# Patient Record
Sex: FEMALE | Race: WHITE | NOT HISPANIC OR LATINO | URBAN - METROPOLITAN AREA
[De-identification: names, ages, dates, MRNs, and addresses within clinical notes are randomized per-mention and may not be internally consistent; named-entity substitution may affect disease eponyms.]

---

## 2019-04-13 ENCOUNTER — INPATIENT (INPATIENT)
Facility: HOSPITAL | Age: 34
LOS: 3 days | Discharge: NURSING, REHAB OR LONG-TERM CARE | End: 2019-04-17
Attending: HOSPITALIST | Admitting: HOSPITALIST
Payer: COMMERCIAL

## 2019-04-13 VITALS
RESPIRATION RATE: 18 BRPM | OXYGEN SATURATION: 100 % | TEMPERATURE: 100 F | DIASTOLIC BLOOD PRESSURE: 44 MMHG | HEART RATE: 119 BPM | WEIGHT: 130.07 LBS | SYSTOLIC BLOOD PRESSURE: 106 MMHG | HEIGHT: 67 IN

## 2019-04-13 DIAGNOSIS — F13.230 SEDATIVE, HYPNOTIC OR ANXIOLYTIC DEPENDENCE WITH WITHDRAWAL, UNCOMPLICATED: ICD-10-CM

## 2019-04-13 LAB
ADD ON TEST-SPECIMEN IN LAB: SIGNIFICANT CHANGE UP
ALBUMIN SERPL ELPH-MCNC: 4.3 G/DL — SIGNIFICANT CHANGE UP (ref 3.3–5)
ALP SERPL-CCNC: 51 U/L — SIGNIFICANT CHANGE UP (ref 40–120)
ALT FLD-CCNC: 21 U/L — SIGNIFICANT CHANGE UP (ref 12–78)
AMPHET UR-MCNC: NEGATIVE — SIGNIFICANT CHANGE UP
ANION GAP SERPL CALC-SCNC: 7 MMOL/L — SIGNIFICANT CHANGE UP (ref 5–17)
APAP SERPL-MCNC: <2 UG/ML — LOW (ref 10–30)
APPEARANCE UR: CLEAR — SIGNIFICANT CHANGE UP
AST SERPL-CCNC: 9 U/L — LOW (ref 15–37)
BACTERIA # UR AUTO: ABNORMAL
BARBITURATES UR SCN-MCNC: NEGATIVE — SIGNIFICANT CHANGE UP
BASOPHILS # BLD AUTO: 0.07 K/UL — SIGNIFICANT CHANGE UP (ref 0–0.2)
BASOPHILS NFR BLD AUTO: 0.4 % — SIGNIFICANT CHANGE UP (ref 0–2)
BENZODIAZ UR-MCNC: POSITIVE — SIGNIFICANT CHANGE UP
BILIRUB SERPL-MCNC: 0.4 MG/DL — SIGNIFICANT CHANGE UP (ref 0.2–1.2)
BILIRUB UR-MCNC: ABNORMAL
BUN SERPL-MCNC: 18 MG/DL — SIGNIFICANT CHANGE UP (ref 7–23)
CALCIUM SERPL-MCNC: 8.8 MG/DL — SIGNIFICANT CHANGE UP (ref 8.5–10.1)
CHLORIDE SERPL-SCNC: 105 MMOL/L — SIGNIFICANT CHANGE UP (ref 96–108)
CO2 SERPL-SCNC: 30 MMOL/L — SIGNIFICANT CHANGE UP (ref 22–31)
COCAINE METAB.OTHER UR-MCNC: NEGATIVE — SIGNIFICANT CHANGE UP
COLOR SPEC: YELLOW — SIGNIFICANT CHANGE UP
COMMENT - URINE: SIGNIFICANT CHANGE UP
CREAT SERPL-MCNC: 0.8 MG/DL — SIGNIFICANT CHANGE UP (ref 0.5–1.3)
DIFF PNL FLD: ABNORMAL
EOSINOPHIL # BLD AUTO: 0 K/UL — SIGNIFICANT CHANGE UP (ref 0–0.5)
EOSINOPHIL NFR BLD AUTO: 0 % — SIGNIFICANT CHANGE UP (ref 0–6)
EPI CELLS # UR: SIGNIFICANT CHANGE UP
ETHANOL SERPL-MCNC: <10 MG/DL — SIGNIFICANT CHANGE UP (ref 0–10)
GLUCOSE SERPL-MCNC: 93 MG/DL — SIGNIFICANT CHANGE UP (ref 70–99)
GLUCOSE UR QL: NEGATIVE MG/DL — SIGNIFICANT CHANGE UP
HCG SERPL-ACNC: <1 MIU/ML — SIGNIFICANT CHANGE UP
HCT VFR BLD CALC: 34.2 % — LOW (ref 34.5–45)
HGB BLD-MCNC: 11.6 G/DL — SIGNIFICANT CHANGE UP (ref 11.5–15.5)
IMM GRANULOCYTES NFR BLD AUTO: 0.4 % — SIGNIFICANT CHANGE UP (ref 0–1.5)
KETONES UR-MCNC: ABNORMAL
LEUKOCYTE ESTERASE UR-ACNC: ABNORMAL
LYMPHOCYTES # BLD AUTO: 13.8 % — SIGNIFICANT CHANGE UP (ref 13–44)
LYMPHOCYTES # BLD AUTO: 2.26 K/UL — SIGNIFICANT CHANGE UP (ref 1–3.3)
MCHC RBC-ENTMCNC: 29.3 PG — SIGNIFICANT CHANGE UP (ref 27–34)
MCHC RBC-ENTMCNC: 33.9 GM/DL — SIGNIFICANT CHANGE UP (ref 32–36)
MCV RBC AUTO: 86.4 FL — SIGNIFICANT CHANGE UP (ref 80–100)
METHADONE UR-MCNC: NEGATIVE — SIGNIFICANT CHANGE UP
MONOCYTES # BLD AUTO: 0.84 K/UL — SIGNIFICANT CHANGE UP (ref 0–0.9)
MONOCYTES NFR BLD AUTO: 5.1 % — SIGNIFICANT CHANGE UP (ref 2–14)
NEUTROPHILS # BLD AUTO: 13.11 K/UL — HIGH (ref 1.8–7.4)
NEUTROPHILS NFR BLD AUTO: 80.3 % — HIGH (ref 43–77)
NITRITE UR-MCNC: NEGATIVE — SIGNIFICANT CHANGE UP
NRBC # BLD: 0 /100 WBCS — SIGNIFICANT CHANGE UP (ref 0–0)
OPIATES UR-MCNC: NEGATIVE — SIGNIFICANT CHANGE UP
PCP SPEC-MCNC: SIGNIFICANT CHANGE UP
PCP UR-MCNC: NEGATIVE — SIGNIFICANT CHANGE UP
PH UR: 6.5 — SIGNIFICANT CHANGE UP (ref 5–8)
PLATELET # BLD AUTO: 408 K/UL — HIGH (ref 150–400)
POTASSIUM SERPL-MCNC: 3 MMOL/L — LOW (ref 3.5–5.3)
POTASSIUM SERPL-SCNC: 3 MMOL/L — LOW (ref 3.5–5.3)
PROT SERPL-MCNC: 8.2 GM/DL — SIGNIFICANT CHANGE UP (ref 6–8.3)
PROT UR-MCNC: 30 MG/DL
RBC # BLD: 3.96 M/UL — SIGNIFICANT CHANGE UP (ref 3.8–5.2)
RBC # FLD: 13.2 % — SIGNIFICANT CHANGE UP (ref 10.3–14.5)
RBC CASTS # UR COMP ASSIST: ABNORMAL /HPF (ref 0–4)
SALICYLATES SERPL-MCNC: 1.8 MG/DL — LOW (ref 2.8–20)
SODIUM SERPL-SCNC: 142 MMOL/L — SIGNIFICANT CHANGE UP (ref 135–145)
SP GR SPEC: 1.01 — SIGNIFICANT CHANGE UP (ref 1.01–1.02)
THC UR QL: NEGATIVE — SIGNIFICANT CHANGE UP
TSH SERPL-MCNC: 0.91 UU/ML — SIGNIFICANT CHANGE UP (ref 0.34–4.82)
UROBILINOGEN FLD QL: 1 MG/DL
WBC # BLD: 16.35 K/UL — HIGH (ref 3.8–10.5)
WBC # FLD AUTO: 16.35 K/UL — HIGH (ref 3.8–10.5)
WBC UR QL: SIGNIFICANT CHANGE UP

## 2019-04-13 PROCEDURE — 71045 X-RAY EXAM CHEST 1 VIEW: CPT | Mod: 26

## 2019-04-13 PROCEDURE — 99285 EMERGENCY DEPT VISIT HI MDM: CPT

## 2019-04-13 PROCEDURE — 93010 ELECTROCARDIOGRAM REPORT: CPT

## 2019-04-13 PROCEDURE — 90792 PSYCH DIAG EVAL W/MED SRVCS: CPT | Mod: GT

## 2019-04-13 PROCEDURE — 70450 CT HEAD/BRAIN W/O DYE: CPT | Mod: 26

## 2019-04-13 RX ORDER — BUPRENORPHINE AND NALOXONE 2; .5 MG/1; MG/1
2 TABLET SUBLINGUAL
Qty: 0 | Refills: 0 | COMMUNITY

## 2019-04-13 RX ORDER — IBUPROFEN 200 MG
200 TABLET ORAL EVERY 8 HOURS
Qty: 0 | Refills: 0 | Status: DISCONTINUED | OUTPATIENT
Start: 2019-04-13 | End: 2019-04-17

## 2019-04-13 RX ORDER — NITROFURANTOIN MACROCRYSTAL 50 MG
100 CAPSULE ORAL ONCE
Qty: 0 | Refills: 0 | Status: COMPLETED | OUTPATIENT
Start: 2019-04-13 | End: 2019-04-13

## 2019-04-13 RX ORDER — NITROFURANTOIN MACROCRYSTAL 50 MG
100 CAPSULE ORAL
Qty: 0 | Refills: 0 | Status: DISCONTINUED | OUTPATIENT
Start: 2019-04-13 | End: 2019-04-14

## 2019-04-13 RX ORDER — POTASSIUM CHLORIDE 20 MEQ
40 PACKET (EA) ORAL ONCE
Qty: 0 | Refills: 0 | Status: COMPLETED | OUTPATIENT
Start: 2019-04-13 | End: 2019-04-13

## 2019-04-13 RX ORDER — FLUCONAZOLE 150 MG/1
150 TABLET ORAL ONCE
Qty: 0 | Refills: 0 | Status: COMPLETED | OUTPATIENT
Start: 2019-04-13 | End: 2019-04-13

## 2019-04-13 RX ORDER — ACETAMINOPHEN 500 MG
650 TABLET ORAL EVERY 6 HOURS
Qty: 0 | Refills: 0 | Status: DISCONTINUED | OUTPATIENT
Start: 2019-04-13 | End: 2019-04-17

## 2019-04-13 RX ADMIN — Medication 2 MILLIGRAM(S): at 19:27

## 2019-04-13 RX ADMIN — Medication 40 MILLIEQUIVALENT(S): at 17:36

## 2019-04-13 RX ADMIN — Medication 100 MILLIGRAM(S): at 17:36

## 2019-04-13 RX ADMIN — FLUCONAZOLE 150 MILLIGRAM(S): 150 TABLET ORAL at 17:36

## 2019-04-13 NOTE — H&P ADULT - NSHPPHYSICALEXAM_GEN_ALL_CORE
Vital Signs Last 24 Hrs  T(C): 36.9 (13 Apr 2019 21:26), Max: 37.8 (13 Apr 2019 12:50)  T(F): 98.4 (13 Apr 2019 21:26), Max: 100 (13 Apr 2019 12:50)  HR: 92 (13 Apr 2019 22:43) (84 - 119)  BP: 112/62 (13 Apr 2019 22:43) (88/66 - 131/79)  RR: 18 (13 Apr 2019 22:43) (18 - 20)  SpO2: 100% (13 Apr 2019 22:43) (97% - 100%)
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
## 2019-04-13 NOTE — ED ADULT NURSE REASSESSMENT NOTE - NS ED NURSE REASSESS COMMENT FT1
Pt talking with telepsych and remains on 1:1. Pt is more lucid and hallucinations has decreased. Pt was given dinner. Will continue to monitor.

## 2019-04-13 NOTE — ED ADULT NURSE NOTE - SUBSTANCE USE COMMENT, PROFILE
hx of substance abuse- currently taking prescribed xanax but reports taking multiple doses at time over the last few weeks.
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
## 2019-04-13 NOTE — ED ADULT NURSE NOTE - TEMPLATE
Alexandra Milner General

## 2019-04-13 NOTE — H&P ADULT - NEUROLOGICAL DETAILS
responds to pain/normal strength/sensation intact/responds to verbal commands/deep reflexes intact/cranial nerves intact

## 2019-04-13 NOTE — ED BEHAVIORAL HEALTH ASSESSMENT NOTE - OTHER PAST PSYCHIATRIC HISTORY (INCLUDE DETAILS REGARDING ONSET, COURSE OF ILLNESS, INPATIENT/OUTPATIENT TREATMENT)
hx of anxiety and depression, no psychiatrist or therapist; has medications prescribed by Olya Mustafa NP (Family Practice); hx of anxiety and depression, no psychiatrist or therapist; has medications prescribed by Olya Mustafa NP (Family Practice); had been seeing therapist, but was missing multiple appointments hx of anxiety and depression, no psychiatrist or therapist; has medications prescribed by Olya Mustafa NP (Family Practice); had been seeing therapist, but was missing multiple appointments; hx of SIB in the past, but no suicide attempt
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
## 2019-04-13 NOTE — ED PROVIDER NOTE - CLINICAL SUMMARY MEDICAL DECISION MAKING FREE TEXT BOX
35 y/o female with tangential thoughts, poly substance abuse. Plan: EKG, CXR, labs, CT, psych. 35 y/o female with tangential thoughts, polysubstance abuse. Plan: EKG, CXR, labs, CT, psych.

## 2019-04-13 NOTE — ED BEHAVIORAL HEALTH ASSESSMENT NOTE - DESCRIPTION (FIRST USE, LAST USE, QUANTITY, FREQUENCY, DURATION)
has cut back, 1/3 PPD; hasn't smoked in 3-4 days occasional marijuana use has tried, but none in years taking Suboxone for 10 years; had a surgery and was put on narcotics, overused them; was smoking roxicodone and doing heroin patient has prescription, but overuses at times

## 2019-04-13 NOTE — ED BEHAVIORAL HEALTH ASSESSMENT NOTE - RISK ASSESSMENT
Acute Suicide Risk  (  ) High   (  ) Moderate   ( X ) Low   (  ) Unable to determine   Rationale: At this time, she is going to continue in a supervised setting. Beyond this, she has no history of suicide attempt, and although she has a history of cutting, this is distant at this time. She is also participating in safety plan, has supportive family, is future oriented and denies any thoughts of self-harm and/or suicide.     Elevated Chronic Risk   ( X ) Yes : given above
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
## 2019-04-13 NOTE — ED ADULT NURSE REASSESSMENT NOTE - COMFORT CARE
po fluids offered/meal provided/plan of care explained
plan of care explained/po fluids offered/meal provided
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
## 2019-04-13 NOTE — ED BEHAVIORAL HEALTH ASSESSMENT NOTE - SUBSTANCE ISSUES AND PLAN (INCLUDE STANDING AND PRN MEDICATION)
Recommend initiating an Ativan taper - may consider starting Ativan 2 mg three times a day, with plan for taper from there; would place patient on CIWA protocol to monitor S/S of worsening withdrawal; would recommend additional symptomatic management of withdrawal symptoms (e.g. Motrin for pain, Trazodone as needed insomnia); discussed the importance of inpatient detoxification given the impact her benzodiazepine overuse has had on her anxiety and mood symptoms Recommend Ativan taper - consider starting Ativan 2 mg three times a day, with plan for taper from there; would place patient on CIWA protocol to monitor S/S of worsening withdrawal; would recommend additional symptomatic management of withdrawal symptoms (e.g. Motrin for pain, Trazodone as needed insomnia); discussed the importance of inpatient detoxification given the impact her benzodiazepine overuse has had on her anxiety and mood symptoms; continue  Suboxone 8-2 mg sl film 1 film BID

## 2019-04-13 NOTE — ED ADULT NURSE REASSESSMENT NOTE - NS ED NURSE REASSESS COMMENT FT1
pt VSS. pt endorses hearing her mother in law talking to her, unknown what pt mother in law said, pt unable to determine. pt on 1:1 watch, safety maintained,will CTM & reassess pt.

## 2019-04-13 NOTE — ED ADULT NURSE REASSESSMENT NOTE - NS ED NURSE REASSESS COMMENT FT1
RN spoke with Idalia BIRD from telepsych who stated she will speak psychiatrist and will call back to speak with pt's mom. Pt remains calm and cooperative, however having active hallucinations. Will continue to monitor.

## 2019-04-13 NOTE — ED PROVIDER NOTE - PROGRESS NOTE DETAILS
Henriibrishi WHITFIELD for ED attending, Dr. Payne: Sign out to Dr. Jerez, pending work up and psych eval at this time. Shaun WHITFIELD for ED attending, Dr. Payne: Sign out to Dr. Jerez, pending work up and psych eval at this time. Patient noticed by 1:1 to be eating her name bracelet; redirected at this time. received sign out pending ct labs psych - spoke with telepsych - will see patient shortly. pt reports she has burning with urination and white discharge. pt has had yeast infection in past and states this feels similar  - will treat with fluconazole and macrobid. Ori Jerez M.D., Attending Physician spoke with telepsych  - rec for ativan taper of 2mg PO TID - also rec for inpatient detox/medical admit for benzo withdrawal seizure/psychosis. pt currently resting comfortably. eating. no shakiness or seizures in ED> endorsed to Dr. Alaina Jerez M.D., Attending Physician
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
## 2019-04-13 NOTE — ED ADULT NURSE NOTE - CAS EDN DISCHARGE ASSESSMENT
Patient baseline mental status/Alert and oriented to person, place and time/No adverse reaction to first time med in ED

## 2019-04-13 NOTE — ED BEHAVIORAL HEALTH ASSESSMENT NOTE - OTHER
[facility]  ([facility], had been incarcerated in [place] for 5 yrs,  in January 2018, 1 month after 's release- although somewhat confused about this) na defer to Emergency Department assessment
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
## 2019-04-13 NOTE — ED ADULT NURSE REASSESSMENT NOTE - NS ED NURSE REASSESS COMMENT FT1
Pt is axox1, actively hallucinating hearing and seeing people. Pt remains on 1:1 and awaiting psych consult. Will continue to monitor.

## 2019-04-13 NOTE — ED ADULT NURSE REASSESSMENT NOTE - NS ED NURSE REASSESS COMMENT FT1
pt resting in stretcher, eating food tray provided. pt on 1:1. pt a/ox3, denies AH/ VH, SI/HI. pt denies complaints/needs at this time. pt family at bedside. VSS. pt safety maintained, will CTM.

## 2019-04-13 NOTE — ED ADULT NURSE NOTE - ED STAT RN HANDOFF DETAILS
Lisette MULLINS Lisette MULLINS    Received report from SUSANNA Higgins, pt awaiting dispo from tele psych for admission. belongings locked with security pt on 1:1.

## 2019-04-13 NOTE — ED ADULT NURSE REASSESSMENT NOTE - NS ED NURSE REASSESS COMMENT FT1
RN brought down to pharmacy pt med from home Buprenorphine-naloxone. 7 packets were closed and 1 packet was open with 2 sublingual medication. This was witness with Anitha miles and given to Shelia, Pharmacist.

## 2019-04-13 NOTE — ED ADULT NURSE NOTE - NSIMPLEMENTINTERV_GEN_ALL_ED
Implemented All Universal Safety Interventions:  [place] to call system. Call bell, personal items and telephone within reach. Instruct patient to call for assistance. Room bathroom lighting operational. Non-slip footwear when patient is off stretcher. Physically safe environment: no spills, clutter or unnecessary equipment. Stretcher in lowest position, wheels locked, appropriate side rails in place.

## 2019-04-13 NOTE — ED PROVIDER NOTE - CARE PLAN
Principal Discharge DX:	Benzodiazepine withdrawal, with delirium  Secondary Diagnosis:	Seizure  Secondary Diagnosis:	UTI (urinary tract infection)  Secondary Diagnosis:	Hypokalemia

## 2019-04-14 LAB
ADD ON TEST-SPECIMEN IN LAB: SIGNIFICANT CHANGE UP
ALBUMIN SERPL ELPH-MCNC: 4.4 G/DL — SIGNIFICANT CHANGE UP (ref 3.3–5)
ALP SERPL-CCNC: 52 U/L — SIGNIFICANT CHANGE UP (ref 40–120)
ALT FLD-CCNC: 21 U/L — SIGNIFICANT CHANGE UP (ref 12–78)
ANION GAP SERPL CALC-SCNC: 8 MMOL/L — SIGNIFICANT CHANGE UP (ref 5–17)
AST SERPL-CCNC: 15 U/L — SIGNIFICANT CHANGE UP (ref 15–37)
BILIRUB DIRECT SERPL-MCNC: 0.1 MG/DL — SIGNIFICANT CHANGE UP (ref 0–0.2)
BILIRUB INDIRECT FLD-MCNC: 0.3 MG/DL — SIGNIFICANT CHANGE UP (ref 0.2–1)
BILIRUB SERPL-MCNC: 0.4 MG/DL — SIGNIFICANT CHANGE UP (ref 0.2–1.2)
BUN SERPL-MCNC: 16 MG/DL — SIGNIFICANT CHANGE UP (ref 7–23)
CALCIUM SERPL-MCNC: 8.7 MG/DL — SIGNIFICANT CHANGE UP (ref 8.5–10.1)
CHLORIDE SERPL-SCNC: 108 MMOL/L — SIGNIFICANT CHANGE UP (ref 96–108)
CO2 SERPL-SCNC: 26 MMOL/L — SIGNIFICANT CHANGE UP (ref 22–31)
CREAT SERPL-MCNC: 0.74 MG/DL — SIGNIFICANT CHANGE UP (ref 0.5–1.3)
CULTURE RESULTS: SIGNIFICANT CHANGE UP
GLUCOSE SERPL-MCNC: 116 MG/DL — HIGH (ref 70–99)
HCT VFR BLD CALC: 35.7 % — SIGNIFICANT CHANGE UP (ref 34.5–45)
HGB BLD-MCNC: 11.9 G/DL — SIGNIFICANT CHANGE UP (ref 11.5–15.5)
LACTATE SERPL-SCNC: 1.5 MMOL/L — SIGNIFICANT CHANGE UP (ref 0.7–2)
MAGNESIUM SERPL-MCNC: 2.1 MG/DL — SIGNIFICANT CHANGE UP (ref 1.6–2.6)
MCHC RBC-ENTMCNC: 29.3 PG — SIGNIFICANT CHANGE UP (ref 27–34)
MCHC RBC-ENTMCNC: 33.3 GM/DL — SIGNIFICANT CHANGE UP (ref 32–36)
MCV RBC AUTO: 87.9 FL — SIGNIFICANT CHANGE UP (ref 80–100)
NRBC # BLD: 0 /100 WBCS — SIGNIFICANT CHANGE UP (ref 0–0)
PHOSPHATE SERPL-MCNC: 3 MG/DL — SIGNIFICANT CHANGE UP (ref 2.5–4.5)
PLATELET # BLD AUTO: 415 K/UL — HIGH (ref 150–400)
POTASSIUM SERPL-MCNC: 3.4 MMOL/L — LOW (ref 3.5–5.3)
POTASSIUM SERPL-SCNC: 3.4 MMOL/L — LOW (ref 3.5–5.3)
PROT SERPL-MCNC: 8 GM/DL — SIGNIFICANT CHANGE UP (ref 6–8.3)
RBC # BLD: 4.06 M/UL — SIGNIFICANT CHANGE UP (ref 3.8–5.2)
RBC # FLD: 13.4 % — SIGNIFICANT CHANGE UP (ref 10.3–14.5)
SODIUM SERPL-SCNC: 142 MMOL/L — SIGNIFICANT CHANGE UP (ref 135–145)
SPECIMEN SOURCE: SIGNIFICANT CHANGE UP
WBC # BLD: 13.1 K/UL — HIGH (ref 3.8–10.5)
WBC # FLD AUTO: 13.1 K/UL — HIGH (ref 3.8–10.5)

## 2019-04-14 PROCEDURE — 99223 1ST HOSP IP/OBS HIGH 75: CPT

## 2019-04-14 RX ORDER — BUPRENORPHINE AND NALOXONE 2; .5 MG/1; MG/1
1 TABLET SUBLINGUAL
Qty: 0 | Refills: 0 | Status: DISCONTINUED | OUTPATIENT
Start: 2019-04-14 | End: 2019-04-17

## 2019-04-14 RX ORDER — NICOTINE POLACRILEX 2 MG
1 GUM BUCCAL DAILY
Qty: 0 | Refills: 0 | Status: DISCONTINUED | OUTPATIENT
Start: 2019-04-14 | End: 2019-04-17

## 2019-04-14 RX ORDER — INFLUENZA VIRUS VACCINE 15; 15; 15; 15 UG/.5ML; UG/.5ML; UG/.5ML; UG/.5ML
0.5 SUSPENSION INTRAMUSCULAR ONCE
Qty: 0 | Refills: 0 | Status: COMPLETED | OUTPATIENT
Start: 2019-04-14 | End: 2019-04-14

## 2019-04-14 RX ADMIN — BUPRENORPHINE AND NALOXONE 1 TABLET(S): 2; .5 TABLET SUBLINGUAL at 23:15

## 2019-04-14 RX ADMIN — Medication 200 MILLIGRAM(S): at 22:18

## 2019-04-14 RX ADMIN — Medication 2 MILLIGRAM(S): at 03:37

## 2019-04-14 RX ADMIN — Medication 1 PATCH: at 16:29

## 2019-04-14 RX ADMIN — Medication 2 MILLIGRAM(S): at 22:18

## 2019-04-14 RX ADMIN — Medication 200 MILLIGRAM(S): at 23:15

## 2019-04-14 RX ADMIN — Medication 2 MILLIGRAM(S): at 16:03

## 2019-04-14 RX ADMIN — Medication 1 PATCH: at 22:26

## 2019-04-14 RX ADMIN — BUPRENORPHINE AND NALOXONE 1 TABLET(S): 2; .5 TABLET SUBLINGUAL at 22:18
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
## 2019-04-14 NOTE — PROGRESS NOTE BEHAVIORAL HEALTH - NSBHCHARTREVIEWIMAGING_PSY_A_CORE FT
IMPRESSION:    No acute intracranial findings.      If acute stroke is of clinical concern, MRI with diffusion-weighted   images would be helpful for further characterization.

## 2019-04-14 NOTE — PROGRESS NOTE BEHAVIORAL HEALTH - NSBHCHARTREVIEWINVESTIGATE_PSY_A_CORE FT
Ventricular Rate 80 BPM    Atrial Rate 80 BPM    P-R Interval 128 ms    QRS Duration 94 ms    Q-T Interval 382 ms    QTC Calculation(Bezet) 440 ms    P Axis 74 degrees    R Axis 52 degrees    T Axis 42 degrees    Diagnosis Line Normal sinus rhythm with sinus arrhythmia  Possible Left atrial enlargement  Borderline ECG  Confirmed by LOKI GARDNER, LINNEA ([id number]) on 4/14/2019 10:59:29 AM

## 2019-04-14 NOTE — PROGRESS NOTE BEHAVIORAL HEALTH - NSBHFUPINTERVALHXFT_PSY_A_CORE
Attempted reeval but Pt sleeping after night of agitation and "hallucinations" per RN.     Will attempt to reevaluate in am.  Receiving Ativan 2 mg q8h (when awake).  Please utilize CIWA Attempted reeval but Pt sleeping after night of agitation and "hallucinations" per RN.     Will attempt to reevaluate in am.  Receiving Ativan 2 mg q8h (when awake).  Continue CIWA
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
## 2019-04-14 NOTE — PATIENT PROFILE ADULT - FUNCTIONAL SCREEN CURRENT LEVEL: SWALLOWING (IF SCORE 2 OR MORE FOR ANY ITEM, CONSULT REHAB SERVICES), MLM)
chest discomfort under L breast, denies radiation of pain. pt denies nausea, denies diaphoresis.
0 = swallows foods/liquids without difficulty

## 2019-04-15 DIAGNOSIS — F13.10 SEDATIVE, HYPNOTIC OR ANXIOLYTIC ABUSE, UNCOMPLICATED: ICD-10-CM

## 2019-04-15 DIAGNOSIS — F43.10 POST-TRAUMATIC STRESS DISORDER, UNSPECIFIED: ICD-10-CM

## 2019-04-15 DIAGNOSIS — F11.21 OPIOID DEPENDENCE, IN REMISSION: ICD-10-CM

## 2019-04-15 DIAGNOSIS — F19.94 OTHER PSYCHOACTIVE SUBSTANCE USE, UNSPECIFIED WITH PSYCHOACTIVE SUBSTANCE-INDUCED MOOD DISORDER: ICD-10-CM

## 2019-04-15 DIAGNOSIS — F32.9 MAJOR DEPRESSIVE DISORDER, SINGLE EPISODE, UNSPECIFIED: ICD-10-CM

## 2019-04-15 LAB
C TRACH RRNA SPEC QL NAA+PROBE: SIGNIFICANT CHANGE UP
HCT VFR BLD CALC: 33.9 % — LOW (ref 34.5–45)
HGB BLD-MCNC: 11.1 G/DL — LOW (ref 11.5–15.5)
MCHC RBC-ENTMCNC: 29 PG — SIGNIFICANT CHANGE UP (ref 27–34)
MCHC RBC-ENTMCNC: 32.7 GM/DL — SIGNIFICANT CHANGE UP (ref 32–36)
MCV RBC AUTO: 88.5 FL — SIGNIFICANT CHANGE UP (ref 80–100)
N GONORRHOEA RRNA SPEC QL NAA+PROBE: SIGNIFICANT CHANGE UP
NRBC # BLD: 0 /100 WBCS — SIGNIFICANT CHANGE UP (ref 0–0)
PLATELET # BLD AUTO: 305 K/UL — SIGNIFICANT CHANGE UP (ref 150–400)
RBC # BLD: 3.83 M/UL — SIGNIFICANT CHANGE UP (ref 3.8–5.2)
RBC # FLD: 13.5 % — SIGNIFICANT CHANGE UP (ref 10.3–14.5)
SPECIMEN SOURCE: SIGNIFICANT CHANGE UP
WBC # BLD: 8.48 K/UL — SIGNIFICANT CHANGE UP (ref 3.8–10.5)
WBC # FLD AUTO: 8.48 K/UL — SIGNIFICANT CHANGE UP (ref 3.8–10.5)

## 2019-04-15 PROCEDURE — 99232 SBSQ HOSP IP/OBS MODERATE 35: CPT

## 2019-04-15 PROCEDURE — 95816 EEG AWAKE AND DROWSY: CPT | Mod: 26

## 2019-04-15 RX ORDER — GABAPENTIN 400 MG/1
100 CAPSULE ORAL THREE TIMES A DAY
Qty: 0 | Refills: 0 | Status: DISCONTINUED | OUTPATIENT
Start: 2019-04-15 | End: 2019-04-17

## 2019-04-15 RX ADMIN — Medication 2 MILLIGRAM(S): at 14:25

## 2019-04-15 RX ADMIN — Medication 1.5 MILLIGRAM(S): at 21:24

## 2019-04-15 RX ADMIN — BUPRENORPHINE AND NALOXONE 1 TABLET(S): 2; .5 TABLET SUBLINGUAL at 06:28

## 2019-04-15 RX ADMIN — BUPRENORPHINE AND NALOXONE 1 TABLET(S): 2; .5 TABLET SUBLINGUAL at 17:18

## 2019-04-15 RX ADMIN — Medication 1 PATCH: at 12:25

## 2019-04-15 RX ADMIN — BUPRENORPHINE AND NALOXONE 1 TABLET(S): 2; .5 TABLET SUBLINGUAL at 05:46

## 2019-04-15 RX ADMIN — Medication 2 MILLIGRAM(S): at 05:46

## 2019-04-15 RX ADMIN — GABAPENTIN 100 MILLIGRAM(S): 400 CAPSULE ORAL at 21:24

## 2019-04-15 NOTE — PROGRESS NOTE BEHAVIORAL HEALTH - NSBHCHARTREVIEWLAB_PSY_A_CORE FT
11.1   8.48  )-----------( 305      ( 15 Apr 2019 06:50 )             33.9   04-14    142  |  108  |  16  ----------------------------<  116<H>  3.4<L>   |  26  |  0.74    Ca    8.7      14 Apr 2019 05:50  Phos  3.0     04-14  Mg     2.1     04-14    TPro  8.0  /  Alb  4.4  /  TBili  0.4  /  DBili  0.1  /  AST  15  /  ALT  21  /  AlkPhos  52  04-14

## 2019-04-15 NOTE — PROGRESS NOTE ADULT - SUBJECTIVE AND OBJECTIVE BOX
HPI:   34 year old woman who was brought in after having seizure like activity in setting of benzodiazepine withdrawal. Patient admits to tapering benzos, and also taking antidepressant medication. She feels better now. 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 No other seizure like events, no numbness, tingling, weakness, headache, trouble speaking or swallowing, vision change. No fever/chills.     ROS: 10 pt ROS neg     MEDICATIONS  (STANDING):  buprenorphine 8 mG/naloxone 2 mG SL  Tablet 1 Tablet(s) SubLingual <User Schedule>  influenza   Vaccine 0.5 milliLiter(s) IntraMuscular once  LORazepam     Tablet 2 milliGRAM(s) Oral three times a day  nicotine -  14 mG/24Hr(s) Patch 1 patch Transdermal daily    Vital Signs Last 24 Hrs  T(C): 37.1 (15 Apr 2019 05:44), Max: 37.1 (15 Apr 2019 05:44)  T(F): 98.7 (15 Apr 2019 05:44), Max: 98.7 (15 Apr 2019 05:44)  HR: 87 (15 Apr 2019 05:44) (85 - 89)  BP: 109/67 (15 Apr 2019 05:44) (108/69 - 114/73)  BP(mean): --  RR: 15 (15 Apr 2019 05:44) (14 - 15)  SpO2: 98% (15 Apr 2019 05:44) (97% - 98%)    Constitutional: awake and alert.  HEENT: PERRLA, EOMI,   Neck: Supple.  Respiratory: Breath sounds are clear bilaterally  Cardiovascular: S1 and S2, regular  Gastrointestinal: soft, nontender  Extremities:  no edema  Musculoskeletal: no joint swelling/tenderness, no abnormal movements  Skin: No rashes    Neurological exam:  HF: Awake, alert, , April 15 2019, intact naming, repetition 3/3 immediate 2/3 delayed recall  CN: MELVIN, EOMI, VFF, facial sensation normal, no NLFD, tongue midline, Palate moves equally, SCM equal bilaterally  Motor: No pronator drift, Strength 5/5 in all 4 ext, normal bulk and tone, no tremor, rigidity or bradykinesia.    Sens: Intact to light touch /and vibration all extremities    Reflexes: Symmetric and normal . BJ 2+, BR 2+, KJ 2+, AJ 2+  Coord:  No FNFA, dysmetria, JHONY intact   Gait/Balance: patient defers                 11.1   8.48  )-----------( 305      ( 15 Apr 2019 06:50 )             33.9     04-14    142  |  108  |  16  ----------------------------<  116<H>  3.4<L>   |  26  |  0.74    Ca    8.7      14 Apr 2019 05:50  Phos  3.0     04-14  Mg     2.1     04-14    TPro  8.0  /  Alb  4.4  /  TBili  0.4  /  DBili  0.1  /  AST  15  /  ALT  21  /  AlkPhos  52  04-14    Radiology report:  - CT Head: no acute changes  - EEG: awaiting     A/P  34 year old woman who was brought in after having seizure like activity in setting of benzodiazepine withdrawal. Today she is improved without any seizure activity.   - c/w Benzos per psych   - EEG to be performed   - No AED as first provoked seizure   - Will follow EEG report and if not concerning will sign off - reconsult if further questions. HPI:   34 year old woman who was brought in after having seizure like activity in setting of benzodiazepine withdrawal. Patient admits to tapering benzos, and also taking antidepressant medication. She feels better now. 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 No other seizure like events, no numbness, tingling, weakness, headache, trouble speaking or swallowing, vision change. No fever/chills.     ROS: 10 pt ROS neg     MEDICATIONS  (STANDING):  buprenorphine 8 mG/naloxone 2 mG SL  Tablet 1 Tablet(s) SubLingual <User Schedule>  influenza   Vaccine 0.5 milliLiter(s) IntraMuscular once  LORazepam     Tablet 2 milliGRAM(s) Oral three times a day  nicotine -  14 mG/24Hr(s) Patch 1 patch Transdermal daily    Vital Signs Last 24 Hrs  T(C): 37.1 (15 Apr 2019 05:44), Max: 37.1 (15 Apr 2019 05:44)  T(F): 98.7 (15 Apr 2019 05:44), Max: 98.7 (15 Apr 2019 05:44)  HR: 87 (15 Apr 2019 05:44) (85 - 89)  BP: 109/67 (15 Apr 2019 05:44) (108/69 - 114/73)  BP(mean): --  RR: 15 (15 Apr 2019 05:44) (14 - 15)  SpO2: 98% (15 Apr 2019 05:44) (97% - 98%)    Constitutional: awake and alert.  HEENT: PERRLA, EOMI,   Neck: Supple.  Respiratory: Breath sounds are clear bilaterally  Cardiovascular: S1 and S2, regular  Gastrointestinal: soft, nontender  Extremities:  no edema  Musculoskeletal: no joint swelling/tenderness, no abnormal movements  Skin: No rashes    Neurological exam:  HF: Awake, alert, , April 15 2019, intact naming, repetition 3/3 immediate 2/3 delayed recall  CN: MELVIN, EOMI, VFF, facial sensation normal, no NLFD, tongue midline, Palate moves equally, SCM equal bilaterally  Motor: No pronator drift, Strength 5/5 in all 4 ext, normal bulk and tone, no tremor, rigidity or bradykinesia.    Sens: Intact to light touch /and vibration all extremities    Reflexes: Symmetric and normal . BJ 2+, BR 2+, KJ 2+, AJ 2+  Coord:  No FNFA, dysmetria, JHONY intact   Gait/Balance: patient defers                 11.1   8.48  )-----------( 305      ( 15 Apr 2019 06:50 )             33.9     04-14    142  |  108  |  16  ----------------------------<  116<H>  3.4<L>   |  26  |  0.74    Ca    8.7      14 Apr 2019 05:50  Phos  3.0     04-14  Mg     2.1     04-14    TPro  8.0  /  Alb  4.4  /  TBili  0.4  /  DBili  0.1  /  AST  15  /  ALT  21  /  AlkPhos  52  04-14    Radiology report:  - CT Head: no acute changes  - EEG: awaiting     A/P  34 year old woman who was brought in after having seizure like activity in setting of benzodiazepine withdrawal. Today she is improved without any seizure activity.   - c/w Benzos per psych   - EEG to be performed   - No AED as first provoked seizure   - Will follow EEG report and if not concerning will sign off - reconsult if further questions.     ADDENDUM:   EEG without epileptiform activity - recommendations as above   EEG Impression/Clinical Correlate:  Excess beta activity is typically a medication effect which can be seen   in the setting of benzodiazepine and barbiturate use. No epileptiform   activity was seen and no clinical events or seizures were recorded.   Consider a repeat study if clinically indicated.

## 2019-04-15 NOTE — PROGRESS NOTE BEHAVIORAL HEALTH - RISK ASSESSMENT
Acute Suicide Risk  (  ) High   (  ) Moderate   ( X ) Low   (  ) Unable to determine   Rationale: At this time, she is going to continue in a supervised setting. Beyond this, she has no history of suicide attempt, and although she has a history of cutting, this is distant at this time. She is also participating in safety plan, has supportive family, is future oriented and denies any thoughts of self-harm and/or suicide.     Elevated Chronic Risk   ( X ) Yes : given above
Acute Suicide Risk  (  ) High   (  ) Moderate   ( X ) Low   (  ) Unable to determine   Rationale: At this time, she is going to continue in a supervised setting. Beyond this, she has no history of suicide attempt, and although she has a history of cutting, this is distant at this time. She is also participating in safety plan, has supportive family, is future oriented and denies any thoughts of self-harm and/or suicide.     Elevated Chronic Risk   ( X ) Yes : given above

## 2019-04-15 NOTE — PROGRESS NOTE BEHAVIORAL HEALTH - NSBHFUPINTERVALHXFT_PSY_A_CORE
met with opt and her mother present at the bedside.   Pt is verbalizing anxiety and wants more xanax. tearful during the interview, depressed,. bur no thinking about death , dying or suicide,. no HI< NO current , , Pi.     Mother is requesting info about rehab centers and wants pt to go to rehab and after that to "residential facility" for a few months to get clean of drugs.
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
## 2019-04-15 NOTE — PROGRESS NOTE BEHAVIORAL HEALTH - NSBHCHARTREVIEWVS_PSY_A_CORE FT
ICU Vital Signs Last 24 Hrs  T(C): 36.7 (14 Apr 2019 05:05), Max: 37.1 (13 Apr 2019 19:38)  T(F): 98.1 (14 Apr 2019 05:05), Max: 98.7 (13 Apr 2019 19:38)  HR: 109 (14 Apr 2019 05:05) (72 - 109)  BP: 108/72 (14 Apr 2019 05:05) (108/72 - 131/79)  BP(mean): --  ABP: --  ABP(mean): --  RR: 17 (14 Apr 2019 05:05) (17 - 18)  SpO2: 96% (14 Apr 2019 05:05) (96% - 100%)
Vital Signs Last 24 Hrs  T(C): 37.1 (15 Apr 2019 11:40), Max: 37.1 (15 Apr 2019 05:44)  T(F): 98.7 (15 Apr 2019 11:40), Max: 98.7 (15 Apr 2019 05:44)  HR: 103 (15 Apr 2019 11:40) (85 - 103)  BP: 115/71 (15 Apr 2019 11:40) (108/69 - 115/71)  BP(mean): --  RR: 18 (15 Apr 2019 11:40) (14 - 18)  SpO2: 98% (15 Apr 2019 11:40) (97% - 98%)

## 2019-04-15 NOTE — PROGRESS NOTE BEHAVIORAL HEALTH - NSBHCONSULTFOLLOWAFTERCARE_PSY_A_CORE FT
recommend follow up treatment for substance abuse vs [facility]
recommend follow up treatment for rehab and substance abuse vs [facility] after pt is detoxed.

## 2019-04-16 RX ADMIN — GABAPENTIN 100 MILLIGRAM(S): 400 CAPSULE ORAL at 21:27

## 2019-04-16 RX ADMIN — Medication 1.5 MILLIGRAM(S): at 05:28

## 2019-04-16 RX ADMIN — BUPRENORPHINE AND NALOXONE 1 TABLET(S): 2; .5 TABLET SUBLINGUAL at 06:33

## 2019-04-16 RX ADMIN — BUPRENORPHINE AND NALOXONE 1 TABLET(S): 2; .5 TABLET SUBLINGUAL at 17:37

## 2019-04-16 RX ADMIN — GABAPENTIN 100 MILLIGRAM(S): 400 CAPSULE ORAL at 05:28

## 2019-04-16 RX ADMIN — BUPRENORPHINE AND NALOXONE 1 TABLET(S): 2; .5 TABLET SUBLINGUAL at 05:28

## 2019-04-16 RX ADMIN — Medication 1 PATCH: at 01:22

## 2019-04-16 RX ADMIN — Medication 1 PATCH: at 13:29

## 2019-04-16 RX ADMIN — GABAPENTIN 100 MILLIGRAM(S): 400 CAPSULE ORAL at 13:30

## 2019-04-16 RX ADMIN — Medication 1.5 MILLIGRAM(S): at 21:28

## 2019-04-16 RX ADMIN — Medication 1.5 MILLIGRAM(S): at 13:30

## 2019-04-16 RX ADMIN — Medication 200 MILLIGRAM(S): at 17:42
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
## 2019-04-17 ENCOUNTER — TRANSCRIPTION ENCOUNTER (OUTPATIENT)
Age: 34
End: 2019-04-17

## 2019-04-17 VITALS
OXYGEN SATURATION: 98 % | DIASTOLIC BLOOD PRESSURE: 66 MMHG | SYSTOLIC BLOOD PRESSURE: 116 MMHG | HEART RATE: 94 BPM | TEMPERATURE: 98 F | RESPIRATION RATE: 16 BRPM

## 2019-04-17 RX ORDER — GABAPENTIN 400 MG/1
1 CAPSULE ORAL
Qty: 0 | Refills: 0 | COMMUNITY
Start: 2019-04-17

## 2019-04-17 RX ORDER — BUPRENORPHINE AND NALOXONE 2; .5 MG/1; MG/1
1 TABLET SUBLINGUAL
Qty: 0 | Refills: 0 | COMMUNITY

## 2019-04-17 RX ORDER — NICOTINE POLACRILEX 2 MG
0 GUM BUCCAL
Qty: 0 | Refills: 0 | COMMUNITY
Start: 2019-04-17

## 2019-04-17 RX ADMIN — Medication 1.5 MILLIGRAM(S): at 06:05

## 2019-04-17 RX ADMIN — GABAPENTIN 100 MILLIGRAM(S): 400 CAPSULE ORAL at 13:02

## 2019-04-17 RX ADMIN — Medication 200 MILLIGRAM(S): at 09:20

## 2019-04-17 RX ADMIN — Medication 1.5 MILLIGRAM(S): at 13:02

## 2019-04-17 RX ADMIN — Medication 200 MILLIGRAM(S): at 08:30

## 2019-04-17 RX ADMIN — BUPRENORPHINE AND NALOXONE 1 TABLET(S): 2; .5 TABLET SUBLINGUAL at 06:05

## 2019-04-17 RX ADMIN — Medication 650 MILLIGRAM(S): at 13:05

## 2019-04-17 RX ADMIN — GABAPENTIN 100 MILLIGRAM(S): 400 CAPSULE ORAL at 06:05

## 2019-04-17 RX ADMIN — Medication 1 PATCH: at 12:31

## 2019-04-17 RX ADMIN — Medication 1 PATCH: at 11:19

## 2019-04-17 RX ADMIN — Medication 1 PATCH: at 07:02

## 2019-04-17 NOTE — DISCHARGE NOTE NURSING/CASE MANAGEMENT/SOCIAL WORK - NSDCCRNUMBER_GEN_ALL_CORE_FT
Provided [facility] Case Management Resources/Addiction Services Folder

## 2019-04-17 NOTE — DISCHARGE NOTE PROVIDER - NSDCCPCAREPLAN_GEN_ALL_CORE_FT
PRINCIPAL DISCHARGE DIAGNOSIS  Diagnosis: Benzodiazepine withdrawal, with delirium  Assessment and Plan of Treatment: continue slow ativan  taper.   transfer to inpatient rehab.

## 2019-04-17 NOTE — DISCHARGE NOTE NURSING/CASE MANAGEMENT/SOCIAL WORK - NSDCDPATPORTLINK_GEN_ALL_CORE
You can access the [facility] Patient Portal, offered by [facility], by registering with the following website: [email or web address]

## 2019-04-17 NOTE — DISCHARGE NOTE PROVIDER - HOSPITAL COURSE
1. acute delirium due to possible benzo withdrawal- improving    - Psych f/u appreciated- ativan taper-      - 1:1 obs    - Neuro f/u appreciated- EEG unremarkable            2. leukocytosis- resolved    -?due to withdrawal    - no signs infection, lactate normal            3. nicotine dependence    - patch        4. hx opioid dependence    - continue home dose suboxone        dvt px    ambulate
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
## 2019-04-17 NOTE — DISCHARGE NOTE PROVIDER - CARE PROVIDER_API CALL
Betzaida Abernathy)  Geriatric Psychiatry; Psychiatry  [address] [facility], [address] [place] [address]  Phone: [phone]  Fax: [phone]  Follow Up Time:

## 2019-04-23 DIAGNOSIS — F17.200 NICOTINE DEPENDENCE, UNSPECIFIED, UNCOMPLICATED: ICD-10-CM

## 2019-04-23 DIAGNOSIS — R56.9 UNSPECIFIED CONVULSIONS: ICD-10-CM

## 2019-04-23 DIAGNOSIS — F19.231 OTHER PSYCHOACTIVE SUBSTANCE DEPENDENCE WITH WITHDRAWAL DELIRIUM: ICD-10-CM

## 2019-04-23 DIAGNOSIS — F11.21 OPIOID DEPENDENCE, IN REMISSION: ICD-10-CM

## 2019-04-23 DIAGNOSIS — N39.0 URINARY TRACT INFECTION, SITE NOT SPECIFIED: ICD-10-CM

## 2019-04-23 DIAGNOSIS — F43.10 POST-TRAUMATIC STRESS DISORDER, UNSPECIFIED: ICD-10-CM

## 2019-04-23 DIAGNOSIS — E87.6 HYPOKALEMIA: ICD-10-CM

## 2019-04-23 DIAGNOSIS — Z91.410 PERSONAL HISTORY OF ADULT PHYSICAL AND SEXUAL ABUSE: ICD-10-CM

## 2019-04-23 DIAGNOSIS — F41.9 ANXIETY DISORDER, UNSPECIFIED: ICD-10-CM

## 2019-09-27 NOTE — ED PROVIDER NOTE - ENMT, MLM
Airway patent, Nasal mucosa clear. Mouth with normal mucosa. Throat has no vesicles, no oropharyngeal exudates and uvula is midline.
verbal instruction

## 2021-07-07 NOTE — ED BEHAVIORAL HEALTH ASSESSMENT NOTE - NS ED [facility] BILLING ATTENDING WO NP TRAINEE
Reevaluated patient at bedside.  Patient feeling much improved.  Discussed the results of all diagnostic testing in ED and copies of all reports given.  Advised will rx for tabs of percocet, continue nsaids, f/u GYN.  An opportunity to ask questions was given.  Discussed the importance of prompt, close medical follow-up.  Patient will return with any changes, concerns or persistent / worsening symptoms.  Understanding of all instructions verbalized. Other

## 2021-09-15 NOTE — ED PROVIDER NOTE - NS ED MD DISPO SPECIAL CONSIDERATION1
AMA (saw a physician/midlevel provider and clinician was able to provide reasons for staying for treatment & form is signed) 1:1 Sitter

## 2023-10-18 NOTE — PATIENT PROFILE ADULT - LAST ORAL INTAKE
Drysol Counseling:  I discussed with the patient the risks of drysol/aluminum chloride including but not limited to skin rash, itching, irritation, burning. 13-Apr-2019 18:00
